# Patient Record
Sex: FEMALE | Race: WHITE | NOT HISPANIC OR LATINO | Employment: FULL TIME | URBAN - METROPOLITAN AREA
[De-identification: names, ages, dates, MRNs, and addresses within clinical notes are randomized per-mention and may not be internally consistent; named-entity substitution may affect disease eponyms.]

---

## 2023-03-07 ENCOUNTER — OFFICE VISIT (OUTPATIENT)
Dept: ORTHOPEDIC SURGERY | Facility: CLINIC | Age: 48
End: 2023-03-07
Payer: COMMERCIAL

## 2023-03-07 VITALS
DIASTOLIC BLOOD PRESSURE: 88 MMHG | WEIGHT: 293 LBS | HEIGHT: 64 IN | BODY MASS INDEX: 50.02 KG/M2 | SYSTOLIC BLOOD PRESSURE: 140 MMHG

## 2023-03-07 DIAGNOSIS — M75.52 BURSITIS OF LEFT SHOULDER: ICD-10-CM

## 2023-03-07 DIAGNOSIS — M75.42 IMPINGEMENT SYNDROME OF LEFT SHOULDER: ICD-10-CM

## 2023-03-07 DIAGNOSIS — M75.22 BICEPS TENDINITIS OF LEFT UPPER EXTREMITY: ICD-10-CM

## 2023-03-07 DIAGNOSIS — M75.112 NONTRAUMATIC INCOMPLETE TEAR OF LEFT ROTATOR CUFF: Primary | ICD-10-CM

## 2023-03-07 PROCEDURE — 99204 OFFICE O/P NEW MOD 45 MIN: CPT | Performed by: ORTHOPAEDIC SURGERY

## 2023-03-07 RX ORDER — SEMAGLUTIDE 1.34 MG/ML
INJECTION, SOLUTION SUBCUTANEOUS
COMMUNITY
Start: 2023-02-21

## 2023-03-07 RX ORDER — LOSARTAN POTASSIUM AND HYDROCHLOROTHIAZIDE 12.5; 1 MG/1; MG/1
TABLET ORAL
COMMUNITY
Start: 2023-01-03

## 2023-03-07 NOTE — PROGRESS NOTES
[facility] Orthopaedic Surgery Office Visit - Iglesia Rucker MD    Office Visit       Patient Name: Sadia Tom    Chief Complaint:   Chief Complaint   Patient presents with   • Left Shoulder - Pain       Referring Physician: Wali Pacheco PA-C-I appreciate the referral    History of Present Illness:   Sadia Tom is a 48 y.o. female who presents with left body part: shoulder Reason: pain.  Onset:Onset: atraumatic and gradual in nature. The issue has been ongoing for 9 month(s). Pain is a 6/10 on the pain scale. Pain is described as Pain Characterization: burning, throbbing and stabbing. Associated symptoms include Symptoms: pain and popping. The pain is worse with working; heat and pain medication and/or NSAID improve the pain. Previous treatments have included: NSAIDS and physical therapy.  I have reviewed the patient's history of present illness as noted/entered above.    I have reviewed the patient's past medical history, surgical history, social history, family history, medications, and allergies as noted in the electronic medical record and as noted/entered.  I have reviewed the patient's review of systems as noted/enter and updated as noted in the patient's HPI.    LEFT   Left shoulder pain for 9 months  Left shoulder MRI was completed at outside facility    She completed formal PA/MD guided physical therapy, prior steroid injection x1 - helped  Severely claustrophobic so it took some time to arrange her MRI at [facility] MRI left shoulder without arthrogram completed 8/26/2022  Radiology notes focal full-thickness tearing supraspinatus near the attachment at the footplate.  Calcific tendinitis of the infraspinatus.  Perhaps mild medial biceps subluxation reported through small partial tearing subscapularis    On my personal review with multiple reviews I do see subtle partial tearing  supraspinatus with possible full-thickness extension.  The biceps and subscapularis pathology are fairly mild.    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 female  Body mass index is 52.83 kg/m².    Subjective   Subjective      Review of Systems   Constitutional: Negative.  Negative for chills, fatigue and fever.   HENT: Negative.  Negative for congestion and dental problem.    Eyes: Negative.  Negative for blurred vision.   Respiratory: Negative.  Negative for shortness of breath.    Cardiovascular: Negative.  Negative for leg swelling.   Gastrointestinal: Negative.  Negative for abdominal pain.   Endocrine: Negative.  Negative for polyuria.   Genitourinary: Negative.  Negative for difficulty urinating.   Musculoskeletal: Positive for arthralgias.   Skin: Negative.    Allergic/Immunologic: Negative.    Neurological: Negative.    Hematological: Negative.  Negative for adenopathy.   Psychiatric/Behavioral: Negative.  Negative for behavioral problems.        Past Medical History:   Past Medical History:   Diagnosis Date   • CTS (carpal tunnel syndrome) Several years ago   • Hernia, abdominal    • Hypertension    • Impingement syndrome of left shoulder 3/7/2023   • Rotator cuff syndrome 2022       Past Surgical History:   Past Surgical History:   Procedure Laterality Date   •  SECTION     • TUBAL ABDOMINAL LIGATION     • VASCULAR SURGERY     • WRIST SURGERY  2022    Carpal tunnel surgery       Family History:   Family History   Problem Relation Age of Onset   • Diabetes Mother    • Hypertension Mother    • Cancer Father    • Cancer Maternal Aunt         Breast cancer   • Cancer Maternal Grandmother         Lung cancer  "      Social History:   Social History     Socioeconomic History   • Marital status: Single   Tobacco Use   • Smoking status: Former     Packs/day: 1.50     Years: 10.00     Pack years: 15.00     Types: Cigarettes   Substance and Sexual Activity   • Alcohol use: Not Currently   • Drug use: Never   • Sexual activity: Yes     Partners: Female, Male     Birth control/protection: Condom, Tubal ligation       Medications:   Current Outpatient Medications:   •  losartan-hydrochlorothiazide (HYZAAR) 100-12.5 MG per tablet, , Disp: , Rfl:   •  Ozempic, 0.25 or 0.5 MG/DOSE, 2 MG/1.5ML solution pen-injector, , Disp: , Rfl:     Allergies:   Allergies   Allergen Reactions   • Ortho Tri-Cyclen Lo [Norgestim-Eth Estrad Triphasic] Hives       The following portions of the patient's history were reviewed and updated as appropriate: allergies, current medications, past family history, past medical history, past social history, past surgical history and problem list.        Objective    Objective      Vital Signs:   Vitals:    03/07/23 1507   BP: 140/88   Weight: (!) 137 kg (303 lb)   Height: 161.3 cm (63.5\")     303 LBS  BMI 52.83    Ortho Exam:  Left shoulder, quite stoic.  She does have some pain with Jobes testing but excellent strength.  Painful arc of motion more than 120 excellent range of motion demonstrated today.  Excellent subscapularis strength mild biceps symptomatology.  Positive Susan positive Dimas    Results Review:   Imaging Results (Last 24 Hours)     ** No results found for the last 24 hours. **        Left shoulder   [facility] MRI left shoulder without arthrogram completed 8/26/2022  Radiology notes focal full-thickness tearing supraspinatus near the attachment at the footplate.  Calcific tendinitis of the infraspinatus.  Perhaps mild medial biceps subluxation reported through small partial tearing subscapularis    Procedures             Assessment / Plan      Assessment/Plan:   Problem List " Items Addressed This Visit        Musculoskeletal and Injuries    Nontraumatic incomplete tear of left rotator cuff - Primary    Relevant Orders    FL Contrast Injection CT / MRI    MRI shoulder left arthrogram    Biceps tendinitis of left upper extremity    Relevant Orders    FL Contrast Injection CT / MRI    MRI shoulder left arthrogram    Impingement syndrome of left shoulder    Relevant Orders    FL Contrast Injection CT / MRI    MRI shoulder left arthrogram    Bursitis of left shoulder    Relevant Orders    FL Contrast Injection CT / MRI    MRI shoulder left arthrogram       LEFT SHOULDER  MRI of the shoulder with arthrogram is recommended.  Prior MRI was greater than 6 months ago  Indication: Known rotator cuff tear  The MRI is critical to evaluate for rotator cuff tearing and will help with possible surgical planning.    She has had MD/PA guided steroid injection and prescription physical therapy    She will continue with home exercise program.  She desires to hold off on injection at this time.  She notes it would be nearly impossible for her to take adequate time off to recover if she did end up needing surgery.  We will get an MRI arthrogram to confirm whether this is indeed a full-thickness tear versus partial tearing.  We will make sure there is no tear propagation and follow along clinically and radiographically.      Follow Up: AFTER LEFT SHOULDER MRI ARTHROGRAM        Iglesia Rucker MD, FAAOS  Orthopedic Surgeon  Fellowship Trained Shoulder and Elbow Surgeon  [facility]  Orthopedics and Sports Medicine  [address], [address]  [place] [address]    03/07/23  15:46 EST

## 2023-03-20 ENCOUNTER — TELEPHONE (OUTPATIENT)
Dept: ORTHOPEDIC SURGERY | Facility: CLINIC | Age: 48
End: 2023-03-20

## 2023-03-20 NOTE — TELEPHONE ENCOUNTER
Provider: DR NORTH    Caller: HERMAN ALMANZA    Relationship to Patient: SELF    Phone Number: [phone]    Reason for Call: PT NEEDS WORK NOTE STATING THAT SHE HAS NO RESTRICTIONS IN ORDER TO COME BACK TO WORK ON Wednesday. SHE IS TAKING OFF THE REST OF TODAY (SHE IS ALREADY SCHEDULED OFF ON 3/21) TO REST HER SHOULDER. FAX #[phone].

## 2023-03-23 ENCOUNTER — TELEPHONE (OUTPATIENT)
Dept: ORTHOPEDIC SURGERY | Facility: CLINIC | Age: 48
End: 2023-03-23
Payer: COMMERCIAL

## 2023-03-23 NOTE — TELEPHONE ENCOUNTER
MAUREENM to call our office back to see where she wants her FMLA forms sent to. She can ask to speak with Jessica.     Jessica ding
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
## 2023-04-26 ENCOUNTER — HOSPITAL ENCOUNTER (OUTPATIENT)
Dept: GENERAL RADIOLOGY | Facility: HOSPITAL | Age: 48
Discharge: HOME | End: 2023-04-26
Payer: COMMERCIAL

## 2023-04-26 ENCOUNTER — HOSPITAL ENCOUNTER (OUTPATIENT)
Dept: MRI IMAGING | Facility: HOSPITAL | Age: 48
Discharge: HOME | End: 2023-04-26
Payer: COMMERCIAL

## 2023-04-26 DIAGNOSIS — M75.52 BURSITIS OF LEFT SHOULDER: ICD-10-CM

## 2023-04-26 DIAGNOSIS — M75.42 IMPINGEMENT SYNDROME OF LEFT SHOULDER: ICD-10-CM

## 2023-04-26 DIAGNOSIS — M75.112 NONTRAUMATIC INCOMPLETE TEAR OF LEFT ROTATOR CUFF: ICD-10-CM

## 2023-04-26 DIAGNOSIS — M75.22 BICEPS TENDINITIS OF LEFT UPPER EXTREMITY: ICD-10-CM

## 2023-04-26 PROCEDURE — 0 GADOBENATE DIMEGLUMINE 529 MG/ML SOLUTION: Performed by: ORTHOPAEDIC SURGERY

## 2023-04-26 PROCEDURE — 73222 MRI JOINT UPR EXTREM W/DYE: CPT

## 2023-04-26 PROCEDURE — A9577 INJ MULTIHANCE: HCPCS | Performed by: ORTHOPAEDIC SURGERY

## 2023-04-26 PROCEDURE — 25510000001 IOPAMIDOL 61 % SOLUTION: Performed by: ORTHOPAEDIC SURGERY

## 2023-04-26 PROCEDURE — 77002 NEEDLE LOCALIZATION BY XRAY: CPT

## 2023-04-26 RX ORDER — LIDOCAINE HYDROCHLORIDE 10 MG/ML
5 INJECTION, SOLUTION EPIDURAL; INFILTRATION; INTRACAUDAL; PERINEURAL ONCE
Status: COMPLETED | OUTPATIENT
Start: 2023-04-26 | End: 2023-04-26

## 2023-04-26 RX ADMIN — GADOBENATE DIMEGLUMINE 1 ML: 529 INJECTION, SOLUTION INTRAVENOUS at 15:49

## 2023-04-26 RX ADMIN — LIDOCAINE HYDROCHLORIDE 5 ML: 10 INJECTION, SOLUTION EPIDURAL; INFILTRATION; INTRACAUDAL; PERINEURAL at 15:49

## 2023-04-26 RX ADMIN — IOPAMIDOL 10 ML: 612 INJECTION, SOLUTION INTRAVENOUS at 15:49
